# Patient Record
Sex: MALE | Employment: FULL TIME | URBAN - METROPOLITAN AREA
[De-identification: names, ages, dates, MRNs, and addresses within clinical notes are randomized per-mention and may not be internally consistent; named-entity substitution may affect disease eponyms.]

---

## 2024-11-07 ENCOUNTER — OFFICE VISIT (OUTPATIENT)
Dept: PRIMARY CARE | Facility: CLINIC | Age: 59
End: 2024-11-07

## 2024-11-07 VITALS
HEART RATE: 103 BPM | WEIGHT: 233 LBS | TEMPERATURE: 98.1 F | OXYGEN SATURATION: 95 % | DIASTOLIC BLOOD PRESSURE: 78 MMHG | BODY MASS INDEX: 32.62 KG/M2 | SYSTOLIC BLOOD PRESSURE: 118 MMHG | HEIGHT: 71 IN

## 2024-11-07 DIAGNOSIS — F32.A DEPRESSION, UNSPECIFIED DEPRESSION TYPE: ICD-10-CM

## 2024-11-07 DIAGNOSIS — K52.9 COLITIS: ICD-10-CM

## 2024-11-07 DIAGNOSIS — I51.7 CARDIOMEGALY: Primary | ICD-10-CM

## 2024-11-07 DIAGNOSIS — N40.0 BENIGN PROSTATIC HYPERPLASIA WITHOUT LOWER URINARY TRACT SYMPTOMS: ICD-10-CM

## 2024-11-07 DIAGNOSIS — R63.4 WEIGHT LOSS: ICD-10-CM

## 2024-11-07 DIAGNOSIS — K76.89 HEPATIC CYST: ICD-10-CM

## 2024-11-07 PROBLEM — G93.89 BRAIN MASS: Status: ACTIVE | Noted: 2024-09-16

## 2024-11-07 PROBLEM — R41.82 ALTERED MENTAL STATUS: Status: ACTIVE | Noted: 2024-09-16

## 2024-11-07 PROBLEM — F41.9 ANXIETY DISORDER: Status: ACTIVE | Noted: 2024-09-22

## 2024-11-07 PROCEDURE — 3008F BODY MASS INDEX DOCD: CPT | Performed by: INTERNAL MEDICINE

## 2024-11-07 PROCEDURE — 99204 OFFICE O/P NEW MOD 45 MIN: CPT | Performed by: INTERNAL MEDICINE

## 2024-11-07 RX ORDER — OMEPRAZOLE 20 MG/1
20 CAPSULE, DELAYED RELEASE ORAL
COMMUNITY

## 2024-11-07 RX ORDER — AMITRIPTYLINE HYDROCHLORIDE 100 MG/1
1-3 TABLET ORAL NIGHTLY
COMMUNITY
Start: 2024-08-22

## 2024-11-07 RX ORDER — LOSARTAN POTASSIUM 100 MG/1
100 TABLET ORAL DAILY
Qty: 100 TABLET | Refills: 3 | Status: SHIPPED | OUTPATIENT
Start: 2024-11-07 | End: 2025-12-12

## 2024-11-07 RX ORDER — ATORVASTATIN CALCIUM 80 MG/1
80 TABLET, FILM COATED ORAL DAILY
COMMUNITY

## 2024-11-07 RX ORDER — LEVETIRACETAM 750 MG/1
750 TABLET ORAL 2 TIMES DAILY
COMMUNITY

## 2024-11-07 RX ORDER — MIRTAZAPINE 15 MG/1
15 TABLET, FILM COATED ORAL NIGHTLY
Qty: 30 TABLET | Refills: 5 | Status: SHIPPED | OUTPATIENT
Start: 2024-11-07 | End: 2025-05-06

## 2024-11-07 NOTE — PROGRESS NOTES
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 male who presents for Establish Care.    HPI Patient is seen today , sp craniotomy , sept 2024 , post op he has not been feeling well   He started to lose weight with 50 pounds lost in the past 7 weeks.  Poor appetite, feels ill a while after eating   , no emisis, bowels changed more constipated.  No fever.  He says there is some meningioma left post op...       He  is resigned to the fact he may be dieing , he is aware that he has part of  the meningioma left there , feels he may have a cancer and would not treat if so.       Remote Gerd Hiatal hernia last 14 years ago  no dysphagia  Perhaps a bit of jaundice that was fleeting   No pain      Remote juvenile polyp    Remote AMY Panic     Chronic Insomnia       Meds   Clonazepam also  [place] 100 mg   Atorvastatin 80   'norvasc 10   Chlorthalidone 50   Protonix also       Mental health not great   no suicide ideation , no drug or etoh abuse he mostly feels resigned to the fact he is going to die.    Fmhx   Sis x 2 doctors   healthy   Some depression   anorexia     Mom a 78 h   D a 90 debility FT       Pre med   Colon 12 years   Psa 2022  Egd 14 yrs ago   Vaccines  Does not want vaccines  Review of Systems   See above   Add to that I did review with him CT kasi abd and pelvis sept 2024 as follows :  right hepatic dome hypodensities measuring 1 cm and 0.6 cm which are too small to characterize. In the absence of known malignancy, these are most likely cysts or hemangiomas.   Short segment area of circumferential wall thickening involving the rectosigmoid colon with mild surrounding stranding which could relate to colitis.   Prostatomegaly. Recommend correlation with PSA laboratory values.     Cardiomegaly and moderate coronary artery atherosclerotic calcifications.     Objective   /78 (BP Location: Right arm, Patient Position: Sitting, BP Cuff Size: Large adult)   Pulse 103   Temp 36.7 °C (98.1 °F)  "(Temporal)   Ht 1.803 m (5' 11\")   Wt 106 kg (233 lb)   SpO2 95%   BMI 32.50 kg/m²     Physical Exam  Constitutional:       Appearance: He is ill-appearing.   Cardiovascular:      Rate and Rhythm: Normal rate and regular rhythm.      Comments: Gerri   ? S3 gallop   Abdominal:      General: Abdomen is flat. There is distension.   Skin:     General: Skin is warm.     Some abd pain left side fullness ? A bit dull  Neck ? Goiter     Assessment/Plan   Diagnoses and all orders for this visit:  Cardiomegaly  Comments:  with S 3 gallop on exam im concerned he may have chf   he does not have orthopnea  Orders:  -     mirtazapine (Remeron) 15 mg tablet; Take 1 tablet (15 mg) by mouth once daily at bedtime.  -     Comprehensive metabolic panel; Future  -     CBC; Future  -     Lipid Panel; Future  -     TSH with reflex to Free T4 if abnormal; Future  -     losartan (Cozaar) 100 mg tablet; Take 1 tablet (100 mg) by mouth once daily.  -     Prostate Specific Antigen, Screen; Future  Hepatic cyst  Comments:  small but does need follow up  Orders:  -     mirtazapine (Remeron) 15 mg tablet; Take 1 tablet (15 mg) by mouth once daily at bedtime.  -     Comprehensive metabolic panel; Future  -     CBC; Future  -     Lipid Panel; Future  -     TSH with reflex to Free T4 if abnormal; Future  -     losartan (Cozaar) 100 mg tablet; Take 1 tablet (100 mg) by mouth once daily.  -     Prostate Specific Antigen, Screen; Future  Depression, unspecified depression type  Comments:  let add mirtazapine for both appetite and sleep  Orders:  -     mirtazapine (Remeron) 15 mg tablet; Take 1 tablet (15 mg) by mouth once daily at bedtime.  -     Comprehensive metabolic panel; Future  -     CBC; Future  -     Lipid Panel; Future  -     TSH with reflex to Free T4 if abnormal; Future  -     losartan (Cozaar) 100 mg tablet; Take 1 tablet (100 mg) by mouth once daily.  -     Prostate Specific Antigen, Screen; Future  Weight loss  Comments:  i see no " obvious cause however needs Pan Endo   he does not want -to pursue anything now  Orders:  -     mirtazapine (Remeron) 15 mg tablet; Take 1 tablet (15 mg) by mouth once daily at bedtime.  -     Comprehensive metabolic panel; Future  -     CBC; Future  -     Lipid Panel; Future  -     TSH with reflex to Free T4 if abnormal; Future  -     losartan (Cozaar) 100 mg tablet; Take 1 tablet (100 mg) by mouth once daily.  -     Prostate Specific Antigen, Screen; Future  Colitis  Comments:  needs colonoscopy  Orders:  -     mirtazapine (Remeron) 15 mg tablet; Take 1 tablet (15 mg) by mouth once daily at bedtime.  -     Comprehensive metabolic panel; Future  -     CBC; Future  -     Lipid Panel; Future  -     TSH with reflex to Free T4 if abnormal; Future  -     losartan (Cozaar) 100 mg tablet; Take 1 tablet (100 mg) by mouth once daily.  -     Prostate Specific Antigen, Screen; Future  Benign prostatic hyperplasia without lower urinary tract symptoms  Comments:  see labs needs psa  Orders:  -     Prostate Specific Antigen, Screen; Future  Other orders  -     Follow Up In Primary Care - Established; Future    Close follow up

## 2024-12-03 ENCOUNTER — APPOINTMENT (OUTPATIENT)
Dept: PRIMARY CARE | Facility: CLINIC | Age: 59
End: 2024-12-03

## 2024-12-03 VITALS
WEIGHT: 233 LBS | TEMPERATURE: 96.5 F | OXYGEN SATURATION: 97 % | BODY MASS INDEX: 32.62 KG/M2 | SYSTOLIC BLOOD PRESSURE: 140 MMHG | HEIGHT: 71 IN | DIASTOLIC BLOOD PRESSURE: 80 MMHG | HEART RATE: 78 BPM

## 2024-12-03 DIAGNOSIS — K76.89 HEPATIC CYST: ICD-10-CM

## 2024-12-03 DIAGNOSIS — F32.A DEPRESSION, UNSPECIFIED DEPRESSION TYPE: ICD-10-CM

## 2024-12-03 DIAGNOSIS — K52.9 COLITIS: ICD-10-CM

## 2024-12-03 DIAGNOSIS — I51.7 CARDIOMEGALY: ICD-10-CM

## 2024-12-03 DIAGNOSIS — R63.4 WEIGHT LOSS: ICD-10-CM

## 2024-12-03 PROCEDURE — 99213 OFFICE O/P EST LOW 20 MIN: CPT | Performed by: INTERNAL MEDICINE

## 2024-12-03 PROCEDURE — 3008F BODY MASS INDEX DOCD: CPT | Performed by: INTERNAL MEDICINE

## 2024-12-03 RX ORDER — AMLODIPINE AND BENAZEPRIL HYDROCHLORIDE 10; 40 MG/1; MG/1
1 CAPSULE ORAL DAILY
Qty: 100 CAPSULE | Refills: 3 | Status: SHIPPED | OUTPATIENT
Start: 2024-12-03 | End: 2026-01-07

## 2024-12-03 RX ORDER — ATORVASTATIN CALCIUM 80 MG/1
80 TABLET, FILM COATED ORAL DAILY
Qty: 90 TABLET | Refills: 1 | Status: SHIPPED | OUTPATIENT
Start: 2024-12-03

## 2024-12-03 RX ORDER — OMEPRAZOLE 20 MG/1
20 CAPSULE, DELAYED RELEASE ORAL
Qty: 90 CAPSULE | Refills: 1 | Status: SHIPPED | OUTPATIENT
Start: 2024-12-03

## 2024-12-03 RX ORDER — LORAZEPAM 0.5 MG/1
0.5 TABLET ORAL 2 TIMES DAILY PRN
Qty: 30 TABLET | Refills: 0 | Status: SHIPPED | OUTPATIENT
Start: 2024-12-03 | End: 2025-03-03

## 2024-12-03 RX ORDER — MIRTAZAPINE 15 MG/1
15 TABLET, FILM COATED ORAL NIGHTLY
Qty: 90 TABLET | Refills: 1 | Status: SHIPPED | OUTPATIENT
Start: 2024-12-03 | End: 2025-06-01

## 2024-12-03 NOTE — PROGRESS NOTES
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    HPISam is much better   Feels well   More active   No gym time   Doing well more anxiety feels good     Review of Systems  I have personally reviewed the [facility]  report on this patient.   I have considered the risks of abuse dependence addiction and diversion. I believe it is clinically appropriate for this patient to be prescribed the medications.   Objective   /80 (BP Location: Right arm, Patient Position: Sitting, BP Cuff Size: Large adult)   Pulse 78   Temp 35.8 °C (96.5 °F) (Temporal)   Ht 1.803 m (5' 11\")   Wt 106 kg (233 lb)   SpO2 97%   BMI 32.50 kg/m²     Physical Exam  NAD  CARD RRR  PULM CTA  ABD NEG   EXT  NL    Assessment/Plan   Diagnoses and all orders for this visit:  Cardiomegaly  Comments:  with S 3 gallop on exam im concerned he may have chf   he does not have orthopnea  Orders:  -     mirtazapine (Remeron) 15 mg tablet; Take 1 tablet (15 mg) by mouth once daily at bedtime.  -     LORazepam (Ativan) 0.5 mg tablet; Take 1 tablet (0.5 mg) by mouth 2 times a day as needed for anxiety.  -     amLODIPine-benazepriL (LotreL) 10-40 mg capsule; Take 1 capsule by mouth once daily.  -     atorvastatin (Lipitor) 80 mg tablet; Take 1 tablet (80 mg) by mouth once daily.  -     omeprazole (PriLOSEC) 20 mg DR capsule; Take 1 capsule (20 mg) by mouth once daily.  Hepatic cyst  Comments:  small but does need follow up  Orders:  -     mirtazapine (Remeron) 15 mg tablet; Take 1 tablet (15 mg) by mouth once daily at bedtime.  -     LORazepam (Ativan) 0.5 mg tablet; Take 1 tablet (0.5 mg) by mouth 2 times a day as needed for anxiety.  -     amLODIPine-benazepriL (LotreL) 10-40 mg capsule; Take 1 capsule by mouth once daily.  -     atorvastatin (Lipitor) 80 mg tablet; Take 1 tablet (80 mg) by mouth once daily.  -     omeprazole (PriLOSEC) 20 mg DR capsule; Take 1 capsule (20 mg) by mouth once daily.  Depression, " unspecified depression type  Comments:  let add mirtazapine for both appetite and sleep  Orders:  -     mirtazapine (Remeron) 15 mg tablet; Take 1 tablet (15 mg) by mouth once daily at bedtime.  -     LORazepam (Ativan) 0.5 mg tablet; Take 1 tablet (0.5 mg) by mouth 2 times a day as needed for anxiety.  -     amLODIPine-benazepriL (LotreL) 10-40 mg capsule; Take 1 capsule by mouth once daily.  -     atorvastatin (Lipitor) 80 mg tablet; Take 1 tablet (80 mg) by mouth once daily.  -     omeprazole (PriLOSEC) 20 mg DR capsule; Take 1 capsule (20 mg) by mouth once daily.  Weight loss  Comments:  i see no obvious cause however needs Pan Endo   he does not want -to pursue anything now  Orders:  -     mirtazapine (Remeron) 15 mg tablet; Take 1 tablet (15 mg) by mouth once daily at bedtime.  -     LORazepam (Ativan) 0.5 mg tablet; Take 1 tablet (0.5 mg) by mouth 2 times a day as needed for anxiety.  -     amLODIPine-benazepriL (LotreL) 10-40 mg capsule; Take 1 capsule by mouth once daily.  -     atorvastatin (Lipitor) 80 mg tablet; Take 1 tablet (80 mg) by mouth once daily.  -     omeprazole (PriLOSEC) 20 mg DR capsule; Take 1 capsule (20 mg) by mouth once daily.  Colitis  Comments:  needs colonoscopy  Orders:  -     mirtazapine (Remeron) 15 mg tablet; Take 1 tablet (15 mg) by mouth once daily at bedtime.  -     LORazepam (Ativan) 0.5 mg tablet; Take 1 tablet (0.5 mg) by mouth 2 times a day as needed for anxiety.  -     amLODIPine-benazepriL (LotreL) 10-40 mg capsule; Take 1 capsule by mouth once daily.  -     atorvastatin (Lipitor) 80 mg tablet; Take 1 tablet (80 mg) by mouth once daily.  -     omeprazole (PriLOSEC) 20 mg DR capsule; Take 1 capsule (20 mg) by mouth once daily.  Other orders  -     Follow Up In Primary Care - Established

## 2024-12-18 DIAGNOSIS — M77.8 TENDINITIS OF SHOULDER, UNSPECIFIED LATERALITY: Primary | ICD-10-CM

## 2024-12-18 RX ORDER — METHYLPREDNISOLONE 4 MG/1
TABLET ORAL
Qty: 21 TABLET | Refills: 0 | Status: SHIPPED | OUTPATIENT
Start: 2024-12-18 | End: 2024-12-24

## 2025-01-07 DIAGNOSIS — N52.9 ERECTILE DYSFUNCTION, UNSPECIFIED ERECTILE DYSFUNCTION TYPE: Primary | ICD-10-CM

## 2025-01-07 RX ORDER — SILDENAFIL 100 MG/1
100 TABLET, FILM COATED ORAL DAILY PRN
Qty: 12 TABLET | Refills: 3 | Status: SHIPPED | OUTPATIENT
Start: 2025-01-07 | End: 2026-01-07

## 2025-03-13 ENCOUNTER — APPOINTMENT (OUTPATIENT)
Dept: PRIMARY CARE | Facility: CLINIC | Age: 60
End: 2025-03-13